# Patient Record
Sex: MALE | Race: WHITE | NOT HISPANIC OR LATINO | Employment: FULL TIME | URBAN - METROPOLITAN AREA
[De-identification: names, ages, dates, MRNs, and addresses within clinical notes are randomized per-mention and may not be internally consistent; named-entity substitution may affect disease eponyms.]

---

## 2024-11-13 ENCOUNTER — OFFICE VISIT (OUTPATIENT)
Dept: URGENT CARE | Age: 29
End: 2024-11-13
Payer: COMMERCIAL

## 2024-11-13 VITALS
HEART RATE: 82 BPM | OXYGEN SATURATION: 97 % | BODY MASS INDEX: 30.8 KG/M2 | TEMPERATURE: 98.5 F | WEIGHT: 220 LBS | SYSTOLIC BLOOD PRESSURE: 131 MMHG | DIASTOLIC BLOOD PRESSURE: 77 MMHG | HEIGHT: 71 IN

## 2024-11-13 DIAGNOSIS — R68.89 FLU-LIKE SYMPTOMS: ICD-10-CM

## 2024-11-13 DIAGNOSIS — R05.9 COUGH, UNSPECIFIED TYPE: ICD-10-CM

## 2024-11-13 DIAGNOSIS — J02.9 VIRAL PHARYNGITIS: Primary | ICD-10-CM

## 2024-11-13 DIAGNOSIS — J02.9 ACUTE SORE THROAT: ICD-10-CM

## 2024-11-13 LAB
POC RAPID INFLUENZA A: NEGATIVE
POC RAPID INFLUENZA B: NEGATIVE
POC RAPID STREP: NEGATIVE
POC SARS-COV-2 AG BINAX: NORMAL

## 2024-11-13 PROCEDURE — 87651 STREP A DNA AMP PROBE: CPT

## 2024-11-13 PROCEDURE — 87804 INFLUENZA ASSAY W/OPTIC: CPT | Performed by: NURSE PRACTITIONER

## 2024-11-13 PROCEDURE — 87880 STREP A ASSAY W/OPTIC: CPT | Performed by: NURSE PRACTITIONER

## 2024-11-13 PROCEDURE — 3008F BODY MASS INDEX DOCD: CPT | Performed by: NURSE PRACTITIONER

## 2024-11-13 PROCEDURE — 87811 SARS-COV-2 COVID19 W/OPTIC: CPT | Performed by: NURSE PRACTITIONER

## 2024-11-13 PROCEDURE — 99203 OFFICE O/P NEW LOW 30 MIN: CPT | Performed by: NURSE PRACTITIONER

## 2024-11-13 ASSESSMENT — ENCOUNTER SYMPTOMS
CHILLS: 1
HEADACHES: 1
SORE THROAT: 1

## 2024-11-13 NOTE — PROGRESS NOTES
"Subjective   Patient ID: Clint Weaver is a 29 y.o. male. They present today with a chief complaint of Cough and Sore Throat (Sore throat and chills. Body aches. Headaches. /Started yesterday morning ).    History of Present Illness  Clint Weaver is a 29 y.o. male who presents to the clinic for sore throat, HA, body aches/chills for the past day.  Pt denies any chest pain, sob, N/V at this time in clinic.             Past Medical History  Allergies as of 11/13/2024    (No Known Allergies)       (Not in a hospital admission)       No past medical history on file.    No past surgical history on file.         Review of Systems  Review of Systems   Constitutional:  Positive for chills.   HENT:  Positive for sore throat.    Neurological:  Positive for headaches.   All other systems reviewed and are negative.                                 Objective    Vitals:    11/13/24 1107   BP: 131/77   Pulse: 82   Temp: 36.9 °C (98.5 °F)   TempSrc: Temporal   SpO2: 97%   Weight: 99.8 kg (220 lb)   Height: 1.803 m (5' 11\")     No LMP for male patient.    Physical Exam  Constitutional:       Appearance: Normal appearance.   HENT:      Right Ear: Tympanic membrane normal.      Left Ear: Tympanic membrane normal.      Nose:      Right Sinus: No maxillary sinus tenderness or frontal sinus tenderness.      Left Sinus: No maxillary sinus tenderness or frontal sinus tenderness.      Mouth/Throat:      Pharynx: Posterior oropharyngeal erythema present.      Tonsils: No tonsillar exudate. 1+ on the right. 1+ on the left.   Eyes:      Extraocular Movements: Extraocular movements intact.      Conjunctiva/sclera: Conjunctivae normal.      Pupils: Pupils are equal, round, and reactive to light.   Cardiovascular:      Rate and Rhythm: Normal rate and regular rhythm.   Pulmonary:      Effort: Pulmonary effort is normal.      Breath sounds: Normal breath sounds.   Neurological:      General: No focal deficit present.      Mental Status: He " is alert and oriented to person, place, and time. Mental status is at baseline.         Procedures    Point of Care Test & Imaging Results from this visit  Results for orders placed or performed in visit on 11/13/24   POCT [facility] Covid-19 Ag Card manually resulted   Result Value Ref Range    POC JENNIFER-COV-2 AG  Presumptive negative test for SARS-CoV-2 (no antigen detected)     Presumptive negative test for SARS-CoV-2 (no antigen detected)   POCT Influenza A/B manually resulted   Result Value Ref Range    POC Rapid Influenza A Negative Negative    POC Rapid Influenza B Negative Negative   POCT rapid strep A manually resulted   Result Value Ref Range    POC Rapid Strep Negative Negative      No results found.    Diagnostic study results (if any) were reviewed by HAYLEE Calero.    Assessment/Plan   Allergies, medications, history, and pertinent labs/EKGs/Imaging reviewed by HAYLEE Calero.     Medical Decision Making  Signs and symptoms consistent with acute viral pharyngitis without evidence of PTA, mono, deep neck infection, or sepsis. Negative Rapid strep in office. Will treat with supportive measures. Patient is encouraged to return to clinic if symptoms worsen and will otherwise follow with PCP. Patient verbalized understanding and agrees with plan.   -strep PCR send out- will treat off result.     Orders and Diagnoses  Diagnoses and all orders for this visit:  Viral pharyngitis  Cough, unspecified type  -     POCT [facility] Covid-19 Ag Card manually resulted  Flu-like symptoms  -     POCT Influenza A/B manually resulted  Acute sore throat  -     POCT rapid strep A manually resulted  -     Group A Streptococcus, PCR      Medical Admin Record      Patient disposition: Home    Electronically signed by HAYLEE Calero  11:50 AM

## 2024-11-13 NOTE — PATIENT INSTRUCTIONS
Strep PCR sent out- if positive, will call and start ABT  Ibuprofen for pain  Cepacol throat lozenge.  If worsening, please go to ER    Please follow up with your primary provider within one week if symptoms do not improve.  You may schedule an appointment online at [email or web address] or call [phone]. Go to the Emergency Department if symptoms significantly worsen or if you develop chest pain or shortness of breath.

## 2024-11-14 LAB — S PYO DNA THROAT QL NAA+PROBE: NOT DETECTED
